# Patient Record
Sex: FEMALE | Race: WHITE
[De-identification: names, ages, dates, MRNs, and addresses within clinical notes are randomized per-mention and may not be internally consistent; named-entity substitution may affect disease eponyms.]

---

## 2018-01-28 ENCOUNTER — HOSPITAL ENCOUNTER (EMERGENCY)
Dept: HOSPITAL 94 - ER | Age: 63
Discharge: HOME | End: 2018-01-28
Payer: MEDICAID

## 2018-01-28 VITALS — SYSTOLIC BLOOD PRESSURE: 137 MMHG | DIASTOLIC BLOOD PRESSURE: 80 MMHG

## 2018-01-28 VITALS — HEIGHT: 66 IN | WEIGHT: 200.62 LBS | BODY MASS INDEX: 32.24 KG/M2

## 2018-01-28 DIAGNOSIS — Z95.1: ICD-10-CM

## 2018-01-28 DIAGNOSIS — H60.93: Primary | ICD-10-CM

## 2018-01-28 PROCEDURE — 99283 EMERGENCY DEPT VISIT LOW MDM: CPT

## 2020-07-26 ENCOUNTER — HOSPITAL ENCOUNTER (EMERGENCY)
Dept: HOSPITAL 94 - ER | Age: 65
Discharge: HOME | End: 2020-07-26
Payer: COMMERCIAL

## 2020-07-26 VITALS — BODY MASS INDEX: 31.89 KG/M2 | WEIGHT: 198.42 LBS | HEIGHT: 66 IN

## 2020-07-26 VITALS — SYSTOLIC BLOOD PRESSURE: 130 MMHG | DIASTOLIC BLOOD PRESSURE: 101 MMHG

## 2020-07-26 DIAGNOSIS — F17.200: ICD-10-CM

## 2020-07-26 DIAGNOSIS — Z98.61: ICD-10-CM

## 2020-07-26 DIAGNOSIS — G89.29: ICD-10-CM

## 2020-07-26 DIAGNOSIS — Z88.5: ICD-10-CM

## 2020-07-26 DIAGNOSIS — Z88.8: ICD-10-CM

## 2020-07-26 DIAGNOSIS — Z79.899: ICD-10-CM

## 2020-07-26 DIAGNOSIS — H10.12: Primary | ICD-10-CM

## 2020-07-26 DIAGNOSIS — J44.9: ICD-10-CM

## 2020-07-26 DIAGNOSIS — I25.10: ICD-10-CM

## 2020-07-26 PROCEDURE — 99283 EMERGENCY DEPT VISIT LOW MDM: CPT

## 2022-10-12 ENCOUNTER — HOSPITAL ENCOUNTER (EMERGENCY)
Dept: HOSPITAL 94 - ER | Age: 67
Discharge: HOME | End: 2022-10-12
Payer: MEDICARE

## 2022-10-12 VITALS — SYSTOLIC BLOOD PRESSURE: 148 MMHG | DIASTOLIC BLOOD PRESSURE: 61 MMHG

## 2022-10-12 VITALS — WEIGHT: 228.49 LBS | HEIGHT: 66 IN | BODY MASS INDEX: 36.72 KG/M2

## 2022-10-12 DIAGNOSIS — Z79.2: ICD-10-CM

## 2022-10-12 DIAGNOSIS — Z86.73: ICD-10-CM

## 2022-10-12 DIAGNOSIS — J45.909: ICD-10-CM

## 2022-10-12 DIAGNOSIS — Z88.5: ICD-10-CM

## 2022-10-12 DIAGNOSIS — I50.9: ICD-10-CM

## 2022-10-12 DIAGNOSIS — I25.10: ICD-10-CM

## 2022-10-12 DIAGNOSIS — J44.9: ICD-10-CM

## 2022-10-12 DIAGNOSIS — G89.29: ICD-10-CM

## 2022-10-12 DIAGNOSIS — Z88.1: ICD-10-CM

## 2022-10-12 DIAGNOSIS — Z79.899: ICD-10-CM

## 2022-10-12 DIAGNOSIS — L25.9: Primary | ICD-10-CM

## 2022-10-12 DIAGNOSIS — Z98.890: ICD-10-CM

## 2022-10-12 LAB
ALBUMIN SERPL BCP-MCNC: 3.7 G/DL (ref 3.4–5)
ALBUMIN/GLOB SERPL: 1 {RATIO} (ref 1.1–1.5)
ALP SERPL-CCNC: 90 IU/L (ref 46–116)
ALT SERPL W P-5'-P-CCNC: 30 U/L (ref 12–78)
ANION GAP SERPL CALCULATED.3IONS-SCNC: 6 MMOL/L (ref 8–16)
AST SERPL W P-5'-P-CCNC: 27 U/L (ref 10–37)
BASOPHILS # BLD AUTO: 0.1 X10'3 (ref 0–0.2)
BASOPHILS NFR BLD AUTO: 0.5 % (ref 0–1)
BILIRUB SERPL-MCNC: 0.4 MG/DL (ref 0.1–1)
BUN SERPL-MCNC: 14 MG/DL (ref 7–18)
BUN/CREAT SERPL: 13 (ref 6.6–38)
CALCIUM SERPL-MCNC: 9.7 MG/DL (ref 8.5–10.1)
CHLORIDE SERPL-SCNC: 101 MMOL/L (ref 99–107)
CO2 SERPL-SCNC: 31.7 MMOL/L (ref 24–32)
CREAT SERPL-MCNC: 1.08 MG/DL (ref 0.4–0.9)
EOSINOPHIL # BLD AUTO: 0.5 X10'3 (ref 0–0.9)
EOSINOPHIL NFR BLD AUTO: 4.1 % (ref 0–6)
ERYTHROCYTE [DISTWIDTH] IN BLOOD BY AUTOMATED COUNT: 14.2 % (ref 11.5–14.5)
GFR SERPL CREATININE-BSD FRML MDRD: 50 ML/MIN
GLUCOSE SERPL-MCNC: 108 MG/DL (ref 70–104)
HCT VFR BLD AUTO: 38.5 % (ref 35–45)
HGB BLD-MCNC: 12.8 G/DL (ref 12–16)
LYMPHOCYTES # BLD AUTO: 2 X10'3 (ref 1.1–4.8)
LYMPHOCYTES NFR BLD AUTO: 17.3 % (ref 21–51)
MCH RBC QN AUTO: 29.8 PG (ref 27–31)
MCHC RBC AUTO-ENTMCNC: 33.2 G/DL (ref 33–36.5)
MCV RBC AUTO: 89.7 FL (ref 78–98)
MONOCYTES # BLD AUTO: 0.8 X10'3 (ref 0–0.9)
MONOCYTES NFR BLD AUTO: 6.8 % (ref 2–12)
NEUTROPHILS # BLD AUTO: 8.4 X10'3 (ref 1.8–7.7)
NEUTROPHILS NFR BLD AUTO: 71.3 % (ref 42–75)
PLATELET # BLD AUTO: 353 X10'3 (ref 140–440)
PMV BLD AUTO: 7.9 FL (ref 7.4–10.4)
POTASSIUM SERPL-SCNC: 3.2 MMOL/L (ref 3.5–5.1)
PROT SERPL-MCNC: 7.5 G/DL (ref 6.4–8.2)
RBC # BLD AUTO: 4.29 X10'6 (ref 4.2–5.6)
SODIUM SERPL-SCNC: 139 MMOL/L (ref 135–145)
WBC # BLD AUTO: 11.8 X10'3 (ref 4.5–11)

## 2022-10-12 PROCEDURE — 84484 ASSAY OF TROPONIN QUANT: CPT

## 2022-10-12 PROCEDURE — 99284 EMERGENCY DEPT VISIT MOD MDM: CPT

## 2022-10-12 PROCEDURE — 85025 COMPLETE CBC W/AUTO DIFF WBC: CPT

## 2022-10-12 PROCEDURE — 80053 COMPREHEN METABOLIC PANEL: CPT

## 2022-10-12 PROCEDURE — 93005 ELECTROCARDIOGRAM TRACING: CPT

## 2022-10-12 PROCEDURE — 36415 COLL VENOUS BLD VENIPUNCTURE: CPT

## 2022-10-12 PROCEDURE — 83880 ASSAY OF NATRIURETIC PEPTIDE: CPT

## 2023-03-12 ENCOUNTER — HOSPITAL ENCOUNTER (OUTPATIENT)
Dept: HOSPITAL 94 - ER | Age: 68
Setting detail: OBSERVATION
Discharge: LEFT BEFORE DISCHARGE | End: 2023-03-12
Attending: FAMILY MEDICINE | Admitting: INTERNAL MEDICINE
Payer: MEDICARE

## 2023-03-12 VITALS — DIASTOLIC BLOOD PRESSURE: 72 MMHG | SYSTOLIC BLOOD PRESSURE: 107 MMHG

## 2023-03-12 VITALS — SYSTOLIC BLOOD PRESSURE: 111 MMHG | DIASTOLIC BLOOD PRESSURE: 78 MMHG

## 2023-03-12 VITALS — SYSTOLIC BLOOD PRESSURE: 139 MMHG | DIASTOLIC BLOOD PRESSURE: 75 MMHG

## 2023-03-12 VITALS — DIASTOLIC BLOOD PRESSURE: 74 MMHG | SYSTOLIC BLOOD PRESSURE: 135 MMHG

## 2023-03-12 VITALS — SYSTOLIC BLOOD PRESSURE: 138 MMHG | DIASTOLIC BLOOD PRESSURE: 78 MMHG

## 2023-03-12 VITALS — SYSTOLIC BLOOD PRESSURE: 158 MMHG | DIASTOLIC BLOOD PRESSURE: 50 MMHG

## 2023-03-12 VITALS — BODY MASS INDEX: 46.65 KG/M2 | HEIGHT: 65 IN | WEIGHT: 279.99 LBS

## 2023-03-12 VITALS — DIASTOLIC BLOOD PRESSURE: 79 MMHG | SYSTOLIC BLOOD PRESSURE: 122 MMHG

## 2023-03-12 DIAGNOSIS — J44.9: ICD-10-CM

## 2023-03-12 DIAGNOSIS — F32.A: ICD-10-CM

## 2023-03-12 DIAGNOSIS — M79.7: ICD-10-CM

## 2023-03-12 DIAGNOSIS — R07.89: Primary | ICD-10-CM

## 2023-03-12 DIAGNOSIS — E66.01: ICD-10-CM

## 2023-03-12 DIAGNOSIS — Z86.73: ICD-10-CM

## 2023-03-12 DIAGNOSIS — I11.0: ICD-10-CM

## 2023-03-12 DIAGNOSIS — Z79.899: ICD-10-CM

## 2023-03-12 DIAGNOSIS — I25.110: ICD-10-CM

## 2023-03-12 DIAGNOSIS — F17.210: ICD-10-CM

## 2023-03-12 DIAGNOSIS — Z90.710: ICD-10-CM

## 2023-03-12 DIAGNOSIS — M06.9: ICD-10-CM

## 2023-03-12 DIAGNOSIS — I50.9: ICD-10-CM

## 2023-03-12 LAB
ALBUMIN SERPL BCP-MCNC: 3.4 G/DL (ref 3.4–5)
ALBUMIN/GLOB SERPL: 1 {RATIO} (ref 1.1–1.5)
ALP SERPL-CCNC: 81 IU/L (ref 46–116)
ALT SERPL W P-5'-P-CCNC: 19 U/L (ref 12–78)
ANION GAP SERPL CALCULATED.3IONS-SCNC: 10 MMOL/L (ref 8–16)
AST SERPL W P-5'-P-CCNC: 13 U/L (ref 10–37)
BASOPHILS # BLD AUTO: 0 X10'3 (ref 0–0.2)
BASOPHILS NFR BLD AUTO: 0.5 % (ref 0–1)
BILIRUB SERPL-MCNC: 0.3 MG/DL (ref 0.1–1)
BUN SERPL-MCNC: 17 MG/DL (ref 7–18)
BUN/CREAT SERPL: 17 (ref 6.6–38)
CALCIUM SERPL-MCNC: 9.2 MG/DL (ref 8.5–10.1)
CHLORIDE SERPL-SCNC: 105 MMOL/L (ref 99–107)
CHOLEST SERPL-MCNC: 240 MG/DL (ref 0–200)
CHOLEST/HDLC SERPL: 4.4 {RATIO} (ref 0–4.99)
CO2 SERPL-SCNC: 28 MMOL/L (ref 24–32)
CREAT SERPL-MCNC: 1 MG/DL (ref 0.4–0.9)
EOSINOPHIL # BLD AUTO: 0.4 X10'3 (ref 0–0.9)
EOSINOPHIL NFR BLD AUTO: 3.8 % (ref 0–6)
ERYTHROCYTE [DISTWIDTH] IN BLOOD BY AUTOMATED COUNT: 14 % (ref 11.5–14.5)
GFR SERPL CREATININE-BSD FRML MDRD: 55 ML/MIN
GLUCOSE SERPL-MCNC: 114 MG/DL (ref 70–104)
HCT VFR BLD AUTO: 39.5 % (ref 35–45)
HDLC SERPL-MCNC: 55 MG/DL (ref 35–60)
HGB BLD-MCNC: 12.7 G/DL (ref 12–16)
LDLC SERPL DIRECT ASSAY-MCNC: 136 MG/DL (ref 50–100)
LYMPHOCYTES # BLD AUTO: 2 X10'3 (ref 1.1–4.8)
LYMPHOCYTES NFR BLD AUTO: 19.7 % (ref 21–51)
MAGNESIUM SERPL-MCNC: 1.9 MG/DL (ref 1.5–2.4)
MCH RBC QN AUTO: 28.4 PG (ref 27–31)
MCHC RBC AUTO-ENTMCNC: 32.1 G/DL (ref 33–36.5)
MCV RBC AUTO: 88.7 FL (ref 78–98)
MONOCYTES # BLD AUTO: 0.9 X10'3 (ref 0–0.9)
MONOCYTES NFR BLD AUTO: 8.6 % (ref 2–12)
NEUTROPHILS # BLD AUTO: 6.7 X10'3 (ref 1.8–7.7)
NEUTROPHILS NFR BLD AUTO: 67.4 % (ref 42–75)
PLATELET # BLD AUTO: 356 X10'3 (ref 140–440)
PMV BLD AUTO: 8.1 FL (ref 7.4–10.4)
POTASSIUM SERPL-SCNC: 3.6 MMOL/L (ref 3.5–5.1)
PROT SERPL-MCNC: 6.9 G/DL (ref 6.4–8.2)
RBC # BLD AUTO: 4.45 X10'6 (ref 4.2–5.6)
SODIUM SERPL-SCNC: 143 MMOL/L (ref 135–145)
TRIGL SERPL-MCNC: 205 MG/DL (ref 20–135)
WBC # BLD AUTO: 9.9 X10'3 (ref 4.5–11)

## 2023-03-12 PROCEDURE — 71250 CT THORAX DX C-: CPT

## 2023-03-12 PROCEDURE — 99285 EMERGENCY DEPT VISIT HI MDM: CPT

## 2023-03-12 PROCEDURE — 85025 COMPLETE CBC W/AUTO DIFF WBC: CPT

## 2023-03-12 PROCEDURE — 93306 TTE W/DOPPLER COMPLETE: CPT

## 2023-03-12 PROCEDURE — 71045 X-RAY EXAM CHEST 1 VIEW: CPT

## 2023-03-12 PROCEDURE — 93005 ELECTROCARDIOGRAM TRACING: CPT

## 2023-03-12 PROCEDURE — 96360 HYDRATION IV INFUSION INIT: CPT

## 2023-03-12 PROCEDURE — 80061 LIPID PANEL: CPT

## 2023-03-12 PROCEDURE — 96361 HYDRATE IV INFUSION ADD-ON: CPT

## 2023-03-12 PROCEDURE — 83735 ASSAY OF MAGNESIUM: CPT

## 2023-03-12 PROCEDURE — 78452 HT MUSCLE IMAGE SPECT MULT: CPT

## 2023-03-12 PROCEDURE — 80053 COMPREHEN METABOLIC PANEL: CPT

## 2023-03-12 PROCEDURE — 93017 CV STRESS TEST TRACING ONLY: CPT

## 2023-03-12 PROCEDURE — 36415 COLL VENOUS BLD VENIPUNCTURE: CPT

## 2023-03-12 PROCEDURE — 84484 ASSAY OF TROPONIN QUANT: CPT

## 2023-03-12 NOTE — NUR
Patient at bedside reported that she took the following medications (home meds) 
brought from home:



Olanzapine 5 mg

Metoprolol 25 mg

Hydroxyzine 25 mg

Lisinopril 5 mg

Gabapentin 600 mg

 Banophen (Benadryl) 



Patient's son reported "she need to take those medications, she already missed 
her dose last night!.

Patient's son and patient notified that it is against our hospital policy to 
self medicate. They both verbalized understanding

## 2023-03-12 NOTE — NUR
Patient reports she took TWO 325mg aspirin TWO hours prior to arrival. Ordered 
aspirin will be held.

## 2023-03-12 NOTE — NUR
Patient reports 2nd nitro brought CP down to 6/10. BP remains stable at 127/79. 
Patient now reports headache.